# Patient Record
Sex: MALE | Race: OTHER
[De-identification: names, ages, dates, MRNs, and addresses within clinical notes are randomized per-mention and may not be internally consistent; named-entity substitution may affect disease eponyms.]

---

## 2023-02-10 ENCOUNTER — OFFICE VISIT (OUTPATIENT)
Dept: PRIMARY CARE CLINIC | Facility: CLINIC | Age: 38
End: 2023-02-10

## 2023-02-10 VITALS
OXYGEN SATURATION: 97 % | DIASTOLIC BLOOD PRESSURE: 89 MMHG | HEART RATE: 78 BPM | SYSTOLIC BLOOD PRESSURE: 130 MMHG | BODY MASS INDEX: 30.82 KG/M2 | HEIGHT: 65 IN | WEIGHT: 185 LBS

## 2023-02-10 DIAGNOSIS — R04.0 EPISTAXIS: Primary | ICD-10-CM

## 2023-02-10 PROCEDURE — 99203 OFFICE O/P NEW LOW 30 MIN: CPT | Performed by: PHYSICIAN ASSISTANT

## 2023-02-10 RX ORDER — CETIRIZINE HYDROCHLORIDE 10 MG/1
10 TABLET ORAL DAILY
Qty: 30 TABLET | Refills: 1 | Status: SHIPPED | OUTPATIENT
Start: 2023-02-10 | End: 2023-03-12

## 2023-02-10 ASSESSMENT — ENCOUNTER SYMPTOMS
SORE THROAT: 0
SINUS PAIN: 0
VOMITING: 0
CONSTIPATION: 0
SHORTNESS OF BREATH: 0
DIARRHEA: 0
RHINORRHEA: 0
SINUS PRESSURE: 0
ABDOMINAL PAIN: 0
EYE PAIN: 0

## 2023-02-10 ASSESSMENT — PATIENT HEALTH QUESTIONNAIRE - PHQ9
SUM OF ALL RESPONSES TO PHQ9 QUESTIONS 1 & 2: 0
2. FEELING DOWN, DEPRESSED OR HOPELESS: 0
SUM OF ALL RESPONSES TO PHQ QUESTIONS 1-9: 0
SUM OF ALL RESPONSES TO PHQ QUESTIONS 1-9: 0
1. LITTLE INTEREST OR PLEASURE IN DOING THINGS: 0
SUM OF ALL RESPONSES TO PHQ QUESTIONS 1-9: 0
SUM OF ALL RESPONSES TO PHQ QUESTIONS 1-9: 0

## 2023-02-10 NOTE — PROGRESS NOTES
Julia Parker (:  1985) is a 30 y.o. male here for evaluation of the following chief complaint(s):  Epistaxis and New Patient         ASSESSMENT/PLAN:  1. Epistaxis  Comments:  referall placed to ENT  ER preacuations given. advised pt to seek immediate evaluation if nose is bleeding more than 20- 30 mins without stopping. Orders:  -     [address] [facility], [place]  -     cetirizine (ZYRTEC) 10 MG tablet; Take 1 tablet by mouth daily, Disp-30 tablet, R-1Normal    No results found for any visits on 02/10/23. Return if symptoms worsen or fail to improve, for and follow up with ENT . Subjective   SUBJECTIVE/OBJECTIVE:  New patient here today c/o nose bleeds that started about a year ago. History is unclear as patient can't seem to describe his symptoms even with the use of . At first he said the bleeding occurs mostly at night  but then he told me it is actually in the mornings. At first he said it started a few days ago then said it had been ongoing for a year or more. Pt told the nurse the bleeding lasts a few minutes but then resolves on its own, but told me that the bleeding lasts at least 3 hours and occurs every morning and not in the evening. Pts partner who is with him today says the bleeding isn't actually constant but more like he has bloody mucus he blows out of his nose and sometimes he has blood that comes out of his nostrils but its not constant. 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 I asked him if he has runny/stuffy nose, sneezing, itchy/watery eyes, headache or any other symptoms due to the dust but he denies. When I again tried to verify if the blood was actually blood from his nares  or more like blood tinged mucus/snot he blows out his nose he says he doesn't have any congestion or drainage and it is just blood.  He denies headaches, dizziness, nausea, vomiting, weakness, fatigue, or any other symptoms, even on the days his nose bleeds for 3 hours. Pt says he has never sought medical evaluation for these symptoms until now. Denies any previous history of head trauma.  [id number] used for entire visit. Epistaxis   The bleeding has been from both nares. This is a chronic problem. The current episode started more than 1 year ago. The problem occurs daily. The problem has been unchanged. Associated with: construction work. He has tried nothing for the symptoms. His past medical history is significant for frequent nosebleeds. There is no history of allergies, a bleeding disorder, colds or sinus problems. No Known Allergies  Current Outpatient Medications   Medication Sig Dispense Refill    cetirizine (ZYRTEC) 10 MG tablet Take 1 tablet by mouth daily 30 tablet 1     No current facility-administered medications for this visit. History reviewed. No pertinent past medical history. History reviewed. No pertinent surgical history. Social History     Tobacco Use    Smoking status: Never    Smokeless tobacco: Never   Vaping Use    Vaping Use: Never used   Substance Use Topics    Alcohol use: Yes    Drug use: Never      Family History   Family history unknown: Yes       Review of Systems   Constitutional:  Negative for chills, fatigue, fever and unexpected weight change. HENT:  Positive for nosebleeds. Negative for congestion, ear discharge, ear pain, postnasal drip, rhinorrhea, sinus pressure, sinus pain, sneezing and sore throat. Eyes:  Negative for pain. Respiratory:  Negative for shortness of breath. Cardiovascular:  Negative for chest pain. Gastrointestinal:  Negative for abdominal pain, constipation, diarrhea and vomiting. Genitourinary:  Negative for dysuria. Skin:  Negative for rash. Allergic/Immunologic: Negative for immunocompromised state. Neurological:  Negative for dizziness, weakness, light-headedness, numbness and headaches. Objective     Vitals:    02/10/23 0926   BP: 130/89   Pulse: 78   SpO2: 97%       Physical Exam  Vitals reviewed. Constitutional:       Appearance: Normal appearance. HENT:      Head: Normocephalic and atraumatic. Right Ear: Tympanic membrane, ear canal and external ear normal.      Left Ear: Tympanic membrane, ear canal and external ear normal.      Nose: No nasal deformity, septal deviation, signs of injury, laceration, nasal tenderness, mucosal edema, congestion or rhinorrhea. Right Nostril: No foreign body, epistaxis, septal hematoma or occlusion. Left Nostril: No foreign body, epistaxis, septal hematoma or occlusion. Right Sinus: No maxillary sinus tenderness or frontal sinus tenderness. Left Sinus: No maxillary sinus tenderness or frontal sinus tenderness. Comments: Mildly edematous turbinates bilaterally      Mouth/Throat:      Mouth: Mucous membranes are moist.      Pharynx: Oropharynx is clear. Eyes:      Extraocular Movements: Extraocular movements intact. Pupils: Pupils are equal, round, and reactive to light. Cardiovascular:      Rate and Rhythm: Normal rate and regular rhythm. Pulmonary:      Effort: Pulmonary effort is normal.      Breath sounds: Normal breath sounds. Abdominal:      General: Bowel sounds are normal.      Palpations: Abdomen is soft. Musculoskeletal:         General: Normal range of motion. Cervical back: Normal range of motion and neck supple. Skin:     General: Skin is warm and dry. Neurological:      General: No focal deficit present. Mental Status: He is alert and oriented to person, place, and time. Psychiatric:         Mood and Affect: Mood normal.         Behavior: Behavior normal.         Judgment: Judgment normal.                An electronic signature was used to authenticate this note.     --NICK Wong

## 2023-02-10 NOTE — LETTER
[address]  [address] [address]  Phone: [phone]  Fax: [address] [facility],[address], [address]        February 10, 2023     Patient: Agnieszka Melgoza   YOB: 1985   Date of Visit: 2/10/2023       To Whom it May Concern:    Agnieszka Melgoza was seen in my clinic on 2/10/2023. He may return to work on 02/11/2023 without any restrictions. If you have any questions or concerns, please don't hesitate to call.     Sincerely,         NICK Gonsalves

## 2023-02-16 ENCOUNTER — OFFICE VISIT (OUTPATIENT)
Dept: ENT CLINIC | Age: 38
End: 2023-02-16

## 2023-02-16 VITALS
WEIGHT: 185 LBS | HEIGHT: 65 IN | BODY MASS INDEX: 30.82 KG/M2 | SYSTOLIC BLOOD PRESSURE: 110 MMHG | DIASTOLIC BLOOD PRESSURE: 70 MMHG

## 2023-02-16 DIAGNOSIS — R04.0 EPISTAXIS: Primary | ICD-10-CM

## 2023-02-16 DIAGNOSIS — R43.8 HYPOSMIA: ICD-10-CM

## 2023-02-16 PROCEDURE — 99203 OFFICE O/P NEW LOW 30 MIN: CPT | Performed by: STUDENT IN AN ORGANIZED HEALTH CARE EDUCATION/TRAINING PROGRAM

## 2023-02-16 PROCEDURE — 30901 CONTROL OF NOSEBLEED: CPT | Performed by: STUDENT IN AN ORGANIZED HEALTH CARE EDUCATION/TRAINING PROGRAM

## 2023-02-16 RX ORDER — MOMETASONE FUROATE 50 UG/1
2 SPRAY, METERED NASAL DAILY
Qty: 2 EACH | Refills: 5 | Status: SHIPPED | OUTPATIENT
Start: 2023-02-16

## 2023-02-16 ASSESSMENT — ENCOUNTER SYMPTOMS
EYE DISCHARGE: 0
COUGH: 0
ABDOMINAL PAIN: 0

## 2023-02-16 NOTE — PROGRESS NOTES
[facility] Office Note    Patient: Christina Garrett  MRN: [id number]  : 1985  Gender:  male  Vital Signs: /70 (Site: Right Upper Arm, Position: Sitting)   Ht 5' 5\" (1.651 m)   Wt 185 lb (83.9 kg)   BMI 30.79 kg/m²   Date: 2023    CC:   Chief Complaint   Patient presents with    Epistaxis     Patient here for nose bleeds. HPI:  Christina Garrett is a 30 y.o. male who endorses getting dust in his nose when at his work. He feels this causes him nosebleeds. He gets them to stop with pressure. Epistaxis is bilateral. He does not use any nasal sprays. No blood thinners. He also notes decreased smell perception for about a year. He denies any significant nasal obstruction. Past Medical History, Past Surgical History, Family history, Social History, and Medications were all reviewed with the patient today and updated as necessary. No Known Allergies  There is no problem list on file for this patient. Current Outpatient Medications   Medication Sig    mometasone (NASONEX) 50 MCG/ACT nasal spray 2 sprays by Nasal route daily    cetirizine (ZYRTEC) 10 MG tablet Take 1 tablet by mouth daily (Patient not taking: Reported on 2023)     No current facility-administered medications for this visit. History reviewed. No pertinent past medical history. Social History     Tobacco Use    Smoking status: Never    Smokeless tobacco: Never   Substance Use Topics    Alcohol use: Yes     History reviewed. No pertinent surgical history. Family History   Family history unknown: Yes        ROS:    Review of Systems   Constitutional:  Negative for fever. HENT:  Positive for nosebleeds. Negative for ear discharge and ear pain. Eyes:  Negative for discharge. Respiratory:  Negative for cough. Cardiovascular:  Negative for chest pain. Gastrointestinal:  Negative for abdominal pain. Genitourinary:  Negative for difficulty urinating. Musculoskeletal:  Negative for neck pain.    Skin:  Negative for rash. Allergic/Immunologic: Negative for environmental allergies. Neurological:  Negative for dizziness. Hematological:  Negative for adenopathy. Psychiatric/Behavioral:  Negative for agitation. PHYSICAL EXAM:    /70 (Site: Right Upper Arm, Position: Sitting)   Ht 5' 5\" (1.651 m)   Wt 185 lb (83.9 kg)   BMI 30.79 kg/m²     General: NAD, well-appearing  Neuro: No gross neuro deficits. CN's II-XII intact. No facial weakness. Eyes: EOMI. Pupils reactive. No periorbital edema/ecchymosis. Skin: No facial erythema, rashes or concerning lesions. Nose: No external deviations or saddling. Intranasally, septum is midline without perforations, prominent caudal septal vessels bilaterally  Mouth: Moist mucus membranes, normal tongue/palate mobility, no concerning mucosal lesions. Oropharynx: clear with no erythema/exudate, no tonsillar hypertrophy. Ears: Normal appearing auricles, no hematomas. EACs clear with no cerumen impaction, healthy canal skin, TM's intact with no perforations or retraction pockets. No middle ear effusions. Neck: Soft, supple, no palpable lateral neck masses. No parotid or submandibular masses. No thyromegaly or palpable thyroid nodules. No surgical scars. Lymphatics: No palpable cervical LAD. Resp/Lungs: No audible stridor or wheezing, CTAB  Heart: RRR  Extremities: No clubbing or cyanosis. Procedure: Control of anterior epistaxis, simple  Indications: Epistaxis  Description: Informed consent was obtained. Afrin and lidocaine mix was applied to the nasal passages bilaterally. Anterior rhinoscopy was performed and there was noted to be prominent anterior septal vessels bilaterally that were taken down using silver nitrate. Patient tolerated the procedure well. ASSESSMENT and PLAN  68-year-old male with epistaxis. I recommend increased nasal hydration using daily nasal saline sprays and Ayr gel and Vaseline to both nares.  Patient should also use Afrin soaked cotton ball and pressure if the bleeding recurs. RTC prn for any further bleeding. I also gave him Nasonex to use for his hyposmia. He will call to schedule a follow-up if he still endorses hyposmia after a month of using Nasonex. ICD-10-CM    1. Epistaxis  R04.0 CTRL NOSEBLEED,ANTER,SIMPLE      2. Hyposmia  R43.8             Bin Canales MD  2/16/2023  Electronically signed    Note dictated using voice recognition software. Please excuse any typos.

## 2023-05-23 ENCOUNTER — HOSPITAL ENCOUNTER (OUTPATIENT)
Dept: CT IMAGING | Age: 38
Discharge: HOME | End: 2023-05-26

## 2023-05-23 DIAGNOSIS — R43.8 HYPOSMIA: Chronic | ICD-10-CM

## 2023-05-23 PROCEDURE — 6360000004 HC RX CONTRAST MEDICATION: Performed by: PHYSICIAN ASSISTANT

## 2023-05-23 PROCEDURE — 70488 CT MAXILLOFACIAL W/O & W/DYE: CPT

## 2023-05-23 PROCEDURE — 2580000003 HC RX 258: Performed by: PHYSICIAN ASSISTANT

## 2023-05-23 RX ORDER — SODIUM CHLORIDE 0.9 % (FLUSH) 0.9 %
10 SYRINGE (ML) INJECTION
Status: COMPLETED | OUTPATIENT
Start: 2023-05-23 | End: 2023-05-23

## 2023-05-23 RX ORDER — 0.9 % SODIUM CHLORIDE 0.9 %
100 INTRAVENOUS SOLUTION INTRAVENOUS ONCE
Status: COMPLETED | OUTPATIENT
Start: 2023-05-23 | End: 2023-05-23

## 2023-05-23 RX ADMIN — SODIUM CHLORIDE, PRESERVATIVE FREE 10 ML: 5 INJECTION INTRAVENOUS at 15:45

## 2023-05-23 RX ADMIN — SODIUM CHLORIDE 100 ML: 9 INJECTION, SOLUTION INTRAVENOUS at 15:45

## 2023-05-23 RX ADMIN — IOPAMIDOL 100 ML: 755 INJECTION, SOLUTION INTRAVENOUS at 15:44

## 2023-05-23 NOTE — CONSULTS
Session ID: [id number]  Request ID: [id number]  Language: Indonesian  Status: Fulfilled   ID: #[id number]   Name: Christopher Ibarra

## 2023-07-27 ENCOUNTER — OFFICE VISIT (OUTPATIENT)
Dept: ENT CLINIC | Age: 38
End: 2023-07-27

## 2023-07-27 VITALS — WEIGHT: 193 LBS | BODY MASS INDEX: 32.15 KG/M2 | HEIGHT: 65 IN

## 2023-07-27 DIAGNOSIS — J32.4 CHRONIC PANSINUSITIS: ICD-10-CM

## 2023-07-27 DIAGNOSIS — R43.8 HYPOSMIA: Primary | ICD-10-CM

## 2023-07-27 DIAGNOSIS — J30.9 ALLERGIC RHINITIS, UNSPECIFIED SEASONALITY, UNSPECIFIED TRIGGER: ICD-10-CM

## 2023-07-27 DIAGNOSIS — R04.0 EPISTAXIS: ICD-10-CM

## 2023-07-27 PROCEDURE — 99213 OFFICE O/P EST LOW 20 MIN: CPT | Performed by: PHYSICIAN ASSISTANT

## 2023-07-27 ASSESSMENT — ENCOUNTER SYMPTOMS
EYE DISCHARGE: 0
ABDOMINAL PAIN: 0
COUGH: 0